# Patient Record
Sex: MALE | URBAN - METROPOLITAN AREA
[De-identification: names, ages, dates, MRNs, and addresses within clinical notes are randomized per-mention and may not be internally consistent; named-entity substitution may affect disease eponyms.]

---

## 2021-02-24 ENCOUNTER — OFFICE VISIT (OUTPATIENT)
Dept: URBAN - METROPOLITAN AREA CLINIC 33 | Facility: CLINIC | Age: 46
End: 2021-02-24
Payer: COMMERCIAL

## 2021-02-24 DIAGNOSIS — Q12.0 CONGENITAL CATARACT: Primary | ICD-10-CM

## 2021-02-24 DIAGNOSIS — H52.4 PRESBYOPIA: ICD-10-CM

## 2021-02-24 PROCEDURE — 99203 OFFICE O/P NEW LOW 30 MIN: CPT | Performed by: OPTOMETRIST

## 2021-02-24 ASSESSMENT — VISUAL ACUITY
OD: 20/25
OS: 20/20

## 2021-02-24 ASSESSMENT — INTRAOCULAR PRESSURE
OD: 20
OS: 20

## 2021-02-24 ASSESSMENT — KERATOMETRY: OS: 43.75

## 2021-02-24 NOTE — IMPRESSION/PLAN
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 Plan: Cataracts are mild and not likely affecting vision. No treatment necessary at this time.

## 2021-02-24 NOTE — IMPRESSION/PLAN
Impression: Presbyopia: H52.4. Plan: Educated patient about today's findings. Order refraction to determine patient's best corrected visual acuity as it relates to presbyopia- dispensed Rx to patient. Patient was educated about 1-2 week adaptation period with new progressive Rx. Discussed OD only corrects to 20/25.